# Patient Record
Sex: MALE | Race: WHITE | URBAN - METROPOLITAN AREA
[De-identification: names, ages, dates, MRNs, and addresses within clinical notes are randomized per-mention and may not be internally consistent; named-entity substitution may affect disease eponyms.]

---

## 2023-02-02 ENCOUNTER — OUT OF OFFICE VISIT (OUTPATIENT)
Dept: URBAN - METROPOLITAN AREA MEDICAL CENTER 18 | Facility: MEDICAL CENTER | Age: 58
End: 2023-02-02

## 2023-02-02 ENCOUNTER — CLAIMS CREATED FROM THE CLAIM WINDOW (OUTPATIENT)
Dept: URBAN - METROPOLITAN AREA MEDICAL CENTER 18 | Facility: MEDICAL CENTER | Age: 58
End: 2023-02-02
Payer: COMMERCIAL

## 2023-02-02 DIAGNOSIS — R13.19 CERVICAL DYSPHAGIA: ICD-10-CM

## 2023-02-02 PROCEDURE — 99254 IP/OBS CNSLTJ NEW/EST MOD 60: CPT | Performed by: INTERNAL MEDICINE

## 2023-02-02 PROCEDURE — 99222 1ST HOSP IP/OBS MODERATE 55: CPT | Performed by: INTERNAL MEDICINE

## 2023-02-02 PROCEDURE — G8427 DOCREV CUR MEDS BY ELIG CLIN: HCPCS | Performed by: INTERNAL MEDICINE

## 2023-02-03 ENCOUNTER — CLAIMS CREATED FROM THE CLAIM WINDOW (OUTPATIENT)
Dept: URBAN - METROPOLITAN AREA MEDICAL CENTER 18 | Facility: MEDICAL CENTER | Age: 58
End: 2023-02-03
Payer: COMMERCIAL

## 2023-02-03 DIAGNOSIS — K22.2 ACQUIRED ESOPHAGEAL RING: ICD-10-CM

## 2023-02-03 DIAGNOSIS — K20.0 ALLERGIC EOSINOPHILIC ESOPHAGITIS: ICD-10-CM

## 2023-02-03 PROCEDURE — 43248 EGD GUIDE WIRE INSERTION: CPT | Performed by: INTERNAL MEDICINE

## 2023-02-03 PROCEDURE — 43239 EGD BIOPSY SINGLE/MULTIPLE: CPT | Performed by: INTERNAL MEDICINE

## 2023-02-03 PROCEDURE — 43249 ESOPH EGD DILATION <30 MM: CPT | Performed by: INTERNAL MEDICINE

## 2023-02-04 ENCOUNTER — CLAIMS CREATED FROM THE CLAIM WINDOW (OUTPATIENT)
Dept: URBAN - METROPOLITAN AREA MEDICAL CENTER 18 | Facility: MEDICAL CENTER | Age: 58
End: 2023-02-04
Payer: COMMERCIAL

## 2023-02-04 DIAGNOSIS — K20.0 ALLERGIC EOSINOPHILIC ESOPHAGITIS: ICD-10-CM

## 2023-02-04 DIAGNOSIS — K22.2 ACQUIRED ESOPHAGEAL RING: ICD-10-CM

## 2023-02-04 PROCEDURE — 99232 SBSQ HOSP IP/OBS MODERATE 35: CPT | Performed by: INTERNAL MEDICINE

## 2023-02-05 ENCOUNTER — CLAIMS CREATED FROM THE CLAIM WINDOW (OUTPATIENT)
Dept: URBAN - METROPOLITAN AREA MEDICAL CENTER 18 | Facility: MEDICAL CENTER | Age: 58
End: 2023-02-05
Payer: COMMERCIAL

## 2023-02-05 DIAGNOSIS — K20.0 ALLERGIC EOSINOPHILIC ESOPHAGITIS: ICD-10-CM

## 2023-02-05 DIAGNOSIS — K22.2 ACQUIRED ESOPHAGEAL RING: ICD-10-CM

## 2023-02-05 PROCEDURE — 99231 SBSQ HOSP IP/OBS SF/LOW 25: CPT | Performed by: INTERNAL MEDICINE

## 2023-02-06 ENCOUNTER — CLAIMS CREATED FROM THE CLAIM WINDOW (OUTPATIENT)
Dept: URBAN - METROPOLITAN AREA MEDICAL CENTER 18 | Facility: MEDICAL CENTER | Age: 58
End: 2023-02-06
Payer: COMMERCIAL

## 2023-02-06 DIAGNOSIS — K22.2 ACQUIRED ESOPHAGEAL RING: ICD-10-CM

## 2023-02-06 DIAGNOSIS — K20.0 ALLERGIC EOSINOPHILIC ESOPHAGITIS: ICD-10-CM

## 2023-02-06 PROCEDURE — 99232 SBSQ HOSP IP/OBS MODERATE 35: CPT | Performed by: INTERNAL MEDICINE

## 2023-02-08 ENCOUNTER — WEB ENCOUNTER (OUTPATIENT)
Dept: URBAN - METROPOLITAN AREA CLINIC 74 | Facility: CLINIC | Age: 58
End: 2023-02-08

## 2023-02-09 ENCOUNTER — WEB ENCOUNTER (OUTPATIENT)
Dept: URBAN - METROPOLITAN AREA CLINIC 74 | Facility: CLINIC | Age: 58
End: 2023-02-09

## 2023-02-14 ENCOUNTER — OFFICE VISIT (OUTPATIENT)
Dept: URBAN - METROPOLITAN AREA CLINIC 74 | Facility: CLINIC | Age: 58
End: 2023-02-14
Payer: COMMERCIAL

## 2023-02-14 ENCOUNTER — OFFICE VISIT (OUTPATIENT)
Dept: URBAN - METROPOLITAN AREA CLINIC 74 | Facility: CLINIC | Age: 58
End: 2023-02-14

## 2023-02-14 VITALS
OXYGEN SATURATION: 98 % | HEART RATE: 76 BPM | DIASTOLIC BLOOD PRESSURE: 76 MMHG | TEMPERATURE: 97.9 F | SYSTOLIC BLOOD PRESSURE: 138 MMHG | WEIGHT: 163.6 LBS

## 2023-02-14 DIAGNOSIS — K22.2 SCHATZKI'S RING: ICD-10-CM

## 2023-02-14 DIAGNOSIS — K22.89 ESOPHAGEAL DILATATION: ICD-10-CM

## 2023-02-14 DIAGNOSIS — R13.12 OROPHARYNGEAL DYSPHAGIA: ICD-10-CM

## 2023-02-14 DIAGNOSIS — K44.9 HIATAL HERNIA: ICD-10-CM

## 2023-02-14 DIAGNOSIS — Z12.11 COLON CANCER SCREENING: ICD-10-CM

## 2023-02-14 DIAGNOSIS — K20.0 EOSINOPHILIC ESOPHAGITIS: ICD-10-CM

## 2023-02-14 PROCEDURE — 99204 OFFICE O/P NEW MOD 45 MIN: CPT | Performed by: PHYSICIAN ASSISTANT

## 2023-02-14 RX ORDER — PANTOPRAZOLE SODIUM 40 MG/1
1 TABLET TABLET, DELAYED RELEASE ORAL TWICE A DAY
Status: ACTIVE | COMMUNITY

## 2023-02-14 RX ORDER — SUCRALFATE 1 G/1
1 TABLET ON AN EMPTY STOMACH TABLET ORAL
Status: ACTIVE | COMMUNITY

## 2023-02-14 RX ORDER — POLYETHYLENE GLYCOL 3350 17 G/17G
AS DIRECTED POWDER, FOR SOLUTION ORAL
Qty: 238 G | Refills: 0 | OUTPATIENT
Start: 2023-02-14 | End: 2023-02-15

## 2023-02-14 RX ORDER — PANTOPRAZOLE SODIUM 40 MG/1
1 TABLET TABLET, DELAYED RELEASE ORAL TWICE A DAY
Qty: 60 | Refills: 3

## 2023-02-14 NOTE — HPI-TODAY'S VISIT:
The patient is 57-year-old male with past medical history as noted below with recent hospitalization on  01/30/2023 for chets pain. AGA was consulted to see the patient for oropharyngeal dysphagia prior to NISHA. He was supposed to be started at the time of dicharged on PPI every 12 hours and course of fluticasone x 8 weeks per Dr. Wilks note. He was also advised to follow up in our clinic in 2-4 weeks. However, the patient was only treated with Pantoprazole 40 mg twice daily and Sucralfate 1 g every 8 hours.  The patient had occasional symptoms of difficulty swallowing solid and sensation in his throat during swallowing prior to his EGD in 02/2023, therefore Cardiology consulted [facility] for EGD evaluation. EGD results as noted below. The patient states that he has family history of brother and his daughter with similar symptoms and frequent EGD with dilation but he does not know if they have EOE. He is doing better and no difficulty swallowing since his EGD. He was seen for follow up visit at Cardiothoracic Surgeon office Dr. Dexter Ferreira for plan to schedule NISHA and to repiar of AAA noted on CT imaging. However, there is still concern about NISHA probe passage through esophagus. Per Cardiology note: Esophageal stenosis. S/P EGD with dilation at 15 mm. Unable to place a normal NISHA probe therefore, we will need pediatric NISHA probe or follow up with GI for possible another dilatation. The patient needs pre-op NISHA and there is a conern with mucosal tear. The patient denies any other GI symptoms today including dysphagia or odynophagia.   ------------------------------Cardiology Summary: Ascending aortic aneurysm  - CTA Chest 01/30/2023 ascending aorta 5.0 cm and SofV 4.3 cm by manual measurement - Echo 02/06/2023 EF 51-55%; Mild AV calcification predominately on RCC. Mild AI. No AS.No obvious mass or vegetation noted. Probable Bicuspid AV - Echo 01/31/2023 EF 51-55%;  mild aortic valve calcification present, moderate AI. No AS. Possible echodensity noted on the MV, however unclear if there is a small echo density on the AV as well - [facility] 02/02/2023  Normal cors with high takeoff RCA( RCA off mid ascending);  AV not crossed due to possible echodensity;  - High risk for aortic complication based on height 69 " - Genetic testing   Aortic insufficiency, moderate - Echo 02/06/2023 EF 51-55% mild AI, no AS. Unclear if AV is bicuspid - Echo 01/31/2023 Moderate AI, No AS - Needs NISHA to further evaluate AV; Needs pediatric probe for procedure   ??? Echodensity MV  - TTE 02/06/2023 No obvious mass or vegetation noted - Unable to perform NISHA due to Schatzki ring/Esophagel stenosis - TTE 01/31/2023 possible echodensity noted on the MV, however unclear if there is a small echo density on the AV as well    -- The patient denies dyspepsia, dysphagia, odynophagia, hemoptysis, hematemesis, nausea, vomiting, regurgitation, melena, constipation, diarrhea, abdominal pain, hematochezia, fever, chills, chest pain, SOB, or any other GI complaints today. -- The patient denies ETOH, Tobacco, and Illicit drug use. -- The patient is up to date with Flu and COVID vaccine.  Diagnostic Studies: -- Labs on 02/14/2023 CBC with Hgb 13.8 nad Hct 38.9.   -- CTA of head and neck on 01/31/2023 with Unremarkable CT angiogram of the neck and head.  Incompletely imaged, ascending aortic aneurysm, measuring up to 4.6 cm in diameter, for which follow-up is advised. Nonemergent aortic protocol CT angiogram of the chest would provide additional characterization, as clinically indicated.  -- CTA of neck on 01/31/2023 with normal MR angiogram of the neck without IV contrast.   -- CT of head on 01/30/2023 with no CT evidence of an acute intracranial abnormality  Procedure: -- EGD with biopsy and dilation on 02/03/2023 by Dr. Wilks noted Schatzki's ring at 39 cm from the incisors with moderate stenosis.  Biopsy performed to "break" the ring and dilation was performed up to a 51-English caliber with a through-the-scope balloon with good effect. A ringed esophagus and white patches suggestive of eosinophilic esophagus noted in the proximal and mid esophagus, biopsy performed. Ring stenosis at 28 cm from the incisors.  Moderate depth tear noted after dilation to 45-English with Savary dilator. 2 cm hiatal hernia. Biospy of esophagus with benign squamous mucosa with increased number of intraepithelial eosinophils. Stomach with chronic inflammation. No H. pylori organisms.

## 2023-02-14 NOTE — PHYSICAL EXAM EYES:
Conjuntivae and eyelids appear normal, Sclerae : White without injection OUTPATIENT PROGRESS NOTE  Date of Service:  11/11/2020  [facility]  [address] [place] [address]  Dept: [phone]  Loc: [phone]    Subjective:      Patient ID:  <[id number]>  Jamie De Leon is a 57 y.o. female    HPI: Patient here for Thyroid. Patient continuing to take 50 mcg daily of synthroid. Patient continuing to take 150 mg of wellbutrin and 20 mg of paxil daily. Denies concerns with this. Patient getting about 7 hours of sleep per night. Patient states she has trouble falling asleep and staying asleep. On the weekends, she states she can sleep all day. Patient states she does not feel well rested. States her sleep problems have been happening for years. Patient has tried [place] in the past but states she did not like it because it made her sleep walk. Patient reports she does snore. Patient does drink tea and caffeine all throughout the day. Patient does not exercise. Currently working from home as an . Patient has PAP smear scheduled for later this month. No other concerns today. Review of Systems   Constitutional: Positive for fatigue. Negative for activity change, appetite change and fever. HENT: Negative. Eyes: Negative. Respiratory: Negative. Negative for cough, shortness of breath and wheezing. Cardiovascular: Negative. Gastrointestinal: Negative for constipation, diarrhea, nausea and vomiting. Endocrine: Negative. Genitourinary: Negative. Musculoskeletal: Negative. Allergic/Immunologic: Negative. Neurological: Negative. Hematological: Negative. Psychiatric/Behavioral: Negative. Objective:   Physical Exam  Vitals signs reviewed. Constitutional:       General: She is awake. Appearance: Normal appearance. HENT:      Head: Normocephalic and atraumatic. Neck:      Musculoskeletal: Normal range of motion.    Cardiovascular:      Rate and Rhythm: Normal rate and regular rhythm. Pulses: Normal pulses. Heart sounds: Normal heart sounds. Pulmonary:      Effort: Pulmonary effort is normal.      Breath sounds: Normal breath sounds. Abdominal:      General: Bowel sounds are normal.      Palpations: Abdomen is soft. Musculoskeletal: Normal range of motion. Skin:     General: Skin is warm and dry. Capillary Refill: Capillary refill takes less than 2 seconds. Neurological:      General: No focal deficit present. Mental Status: She is alert. Mental status is at baseline. Cranial Nerves: Cranial nerves are intact. Sensory: Sensation is intact. Motor: Motor function is intact. Coordination: Coordination is intact. Gait: Gait is intact. Psychiatric:         Attention and Perception: Attention and perception normal.         Mood and Affect: Mood and affect normal.         Speech: Speech normal.         Behavior: Behavior normal. Behavior is cooperative. Thought Content: Thought content normal.         Cognition and Memory: Cognition and memory normal.         Judgment: Judgment normal.           Assessment/Plan   1. Acquired hypothyroidism  TSH without reflex collected today. Will refilled synthroid dose once TSH lab results.   - TSH without Reflex    2. Fatigue, unspecified type  CMP, CBC, and Iron and TIBC collected today. Encourage patient to stop caffeine intake after 2 pm and encourage patient to exercise for at least 10 minutes per day. Talked to patient about trying to get 7,000 steps in per day. Increase water intake to at least 64 oz per day and discontinue tea after 2 pm.   - Comprehensive Metabolic Panel  - CBC Auto Differential  - Iron and TIBC    3. Anxiety and depression  Continue taking wellbutrin and paxil as prescribed. F/U in 6 weeks for sleep.                  Ishmael Menifee, APRN - CNP

## 2023-02-16 ENCOUNTER — TELEPHONE ENCOUNTER (OUTPATIENT)
Dept: URBAN - METROPOLITAN AREA CLINIC 74 | Facility: CLINIC | Age: 58
End: 2023-02-16

## 2023-02-24 ENCOUNTER — CLAIMS CREATED FROM THE CLAIM WINDOW (OUTPATIENT)
Dept: URBAN - METROPOLITAN AREA CLINIC 4 | Facility: CLINIC | Age: 58
End: 2023-02-24
Payer: COMMERCIAL

## 2023-02-24 ENCOUNTER — OFFICE VISIT (OUTPATIENT)
Dept: URBAN - METROPOLITAN AREA SURGERY CENTER 30 | Facility: SURGERY CENTER | Age: 58
End: 2023-02-24
Payer: COMMERCIAL

## 2023-02-24 DIAGNOSIS — Z12.11 COLON CANCER SCREENING: ICD-10-CM

## 2023-02-24 DIAGNOSIS — D12.4 BENIGN NEOPLASM OF DESCENDING COLON: ICD-10-CM

## 2023-02-24 DIAGNOSIS — D12.8 ADENOMATOUS POLYP OF RECTUM: ICD-10-CM

## 2023-02-24 DIAGNOSIS — D12.4 ADENOMA OF DESCENDING COLON: ICD-10-CM

## 2023-02-24 PROCEDURE — 88305 TISSUE EXAM BY PATHOLOGIST: CPT | Performed by: PATHOLOGY

## 2023-02-24 PROCEDURE — 45385 COLONOSCOPY W/LESION REMOVAL: CPT | Performed by: INTERNAL MEDICINE

## 2023-02-24 PROCEDURE — 45380 COLONOSCOPY AND BIOPSY: CPT | Performed by: INTERNAL MEDICINE

## 2023-02-24 PROCEDURE — G8907 PT DOC NO EVENTS ON DISCHARG: HCPCS | Performed by: INTERNAL MEDICINE

## 2023-03-01 ENCOUNTER — OFFICE VISIT (OUTPATIENT)
Dept: URBAN - METROPOLITAN AREA CLINIC 80 | Facility: CLINIC | Age: 58
End: 2023-03-01

## 2023-03-07 PROBLEM — 235599003: Status: ACTIVE | Noted: 2023-02-09

## 2023-03-07 PROBLEM — 428283002: Status: ACTIVE | Noted: 2023-03-07

## 2023-03-07 PROBLEM — 71457002: Status: ACTIVE | Noted: 2023-02-09

## 2023-03-07 PROBLEM — 235623002: Status: ACTIVE | Noted: 2023-02-09

## 2023-03-15 ENCOUNTER — TELEPHONE ENCOUNTER (OUTPATIENT)
Dept: URBAN - METROPOLITAN AREA CLINIC 35 | Facility: CLINIC | Age: 58
End: 2023-03-15

## 2023-03-24 ENCOUNTER — OFFICE VISIT (OUTPATIENT)
Dept: URBAN - METROPOLITAN AREA CLINIC 74 | Facility: CLINIC | Age: 58
End: 2023-03-24
Payer: COMMERCIAL

## 2023-03-24 VITALS
TEMPERATURE: 97.1 F | HEIGHT: 69 IN | WEIGHT: 157.4 LBS | SYSTOLIC BLOOD PRESSURE: 110 MMHG | HEART RATE: 86 BPM | DIASTOLIC BLOOD PRESSURE: 64 MMHG | BODY MASS INDEX: 23.31 KG/M2

## 2023-03-24 DIAGNOSIS — K22.89 ESOPHAGEAL DILATATION: ICD-10-CM

## 2023-03-24 DIAGNOSIS — Z86.010 PERSONAL HISTORY OF COLONIC POLYPS: ICD-10-CM

## 2023-03-24 DIAGNOSIS — K20.0 EOSINOPHILIC ESOPHAGITIS: ICD-10-CM

## 2023-03-24 DIAGNOSIS — K22.2 SCHATZKI'S RING: ICD-10-CM

## 2023-03-24 DIAGNOSIS — K44.9 HIATAL HERNIA: ICD-10-CM

## 2023-03-24 DIAGNOSIS — K64.8 INTERNAL HEMORRHOIDS: ICD-10-CM

## 2023-03-24 PROCEDURE — 99213 OFFICE O/P EST LOW 20 MIN: CPT | Performed by: PHYSICIAN ASSISTANT

## 2023-03-24 RX ORDER — PANTOPRAZOLE SODIUM 40 MG/1
1 TABLET TABLET, DELAYED RELEASE ORAL TWICE A DAY
Qty: 60 | Refills: 3 | Status: ACTIVE | COMMUNITY

## 2023-03-24 RX ORDER — SUCRALFATE 1 G/1
1 TABLET ON AN EMPTY STOMACH TABLET ORAL
Status: DISCONTINUED | COMMUNITY

## 2023-03-24 NOTE — HPI-TODAY'S VISIT:
The patient is 57-year-old male with past medical history as noted below is here to discuss his recent Colonoscopy results. No new GI issues today.   -- He was seen by ENT Dr. Rolando Kelley today for Uvulitis after NISHA, posttraumatic.   -- S/P 3D NISHA for aortic/mitral valve disease with moderate sedation on 03/22/2023 by Dr. Reggie Larry III.    -- The patient denies dyspepsia, dysphagia, odynophagia, hemoptysis, hematemesis, nausea, vomiting, regurgitation, melena, constipation, diarrhea, abdominal pain, hematochezia, fever, chills, chest pain, SOB, or any other GI complaints today. -- The patient denies ETOH, Tobacco, and Illicit drug use. -- The patient is up to date with Flu and COVID vaccine.  Diagnostic Studies: -- BS study on 02/16/2023 with evidence for a small hiatal hernia with a B-ring. Mild narrowing at the B-ring is noted. A 13 mm barium tablet passed this region without difficulty.  -- Labs on 02/14/2023 CBC with Hgb 13.8 nad Hct 38.9.   -- CTA of head and neck on 01/31/2023 with Unremarkable CT angiogram of the neck and head.  Incompletely imaged, ascending aortic aneurysm, measuring up to 4.6 cm in diameter, for which follow-up is advised. Nonemergent aortic protocol CT angiogram of the chest would provide additional characterization, as clinically indicated.  -- CTA of neck on 01/31/2023 with normal MR angiogram of the neck without IV contrast.   -- CT of head on 01/30/2023 with no CT evidence of an acute intracranial abnormality  Procedures: -- Colonoscopy with polypectomy on 02/20/2023 by Dr. Christianson noted one 25 mm polyp in the rectum, removed with a hot snare.  Resected and retrieved.  Clips were placed.  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  Resected and retrieved.  Non-bleeding internal hemorrhoids.  Repeat colonoscopy in 6 months for surveillance after piecemeal polypectomy.    Biopsy tubular and tubulovillous adenoma with focal high-grade dysplasia.  The polyp was completely removed.  Recommend surveillance colonoscopy in 3 months to document no residual lesion due to piecemeal polypectomy.   -- EGD with biopsy and dilation on 02/03/2023 by Dr. Wilks noted Schatzki's ring at 39 cm from the incisors with moderate stenosis.  Biopsy performed to "break" the ring and dilation was performed up to a 51-Somali caliber with a through-the-scope balloon with good effect. A ringed esophagus and white patches suggestive of eosinophilic esophagus noted in the proximal and mid esophagus, biopsy performed. Ring stenosis at 28 cm from the incisors.  Moderate depth tear noted after dilation to 45-Somali with Savary dilator. 2 cm hiatal hernia. Biospy of esophagus with benign squamous mucosa with increased number of intraepithelial eosinophils. Stomach with chronic inflammation. No H. pylori organisms.

## 2023-06-23 ENCOUNTER — OFFICE VISIT (OUTPATIENT)
Dept: URBAN - METROPOLITAN AREA CLINIC 74 | Facility: CLINIC | Age: 58
End: 2023-06-23

## 2023-06-23 RX ORDER — PANTOPRAZOLE SODIUM 40 MG/1
1 TABLET TABLET, DELAYED RELEASE ORAL TWICE A DAY
Qty: 60 | Refills: 3 | Status: ACTIVE | COMMUNITY

## 2023-06-23 NOTE — HPI-TODAY'S VISIT:
The patient is 57-year-old male with past medical history as noted below is here to schedule his repaet EGD and Colonoscopy today.  -- He was seen by ENT Dr. Rolando Kelley today for Uvulitis after NISHA, posttraumatic.   -- S/P 3D NISHA for aortic/mitral valve disease with moderate sedation on 03/22/2023 by Dr. Reggie Larry III.    -- The patient denies dyspepsia, dysphagia, odynophagia, hemoptysis, hematemesis, nausea, vomiting, regurgitation, melena, constipation, diarrhea, abdominal pain, hematochezia, fever, chills, chest pain, SOB, or any other GI complaints today. -- The patient denies ETOH, Tobacco, and Illicit drug use. -- The patient is up to date with Flu and COVID vaccine. -- Denies NSAID's.   Diagnostic Studies: -- BS study on 02/16/2023 with evidence for a small hiatal hernia with a B-ring. Mild narrowing at the B-ring is noted. A 13 mm barium tablet passed this region without difficulty.  -- Labs on 02/14/2023 CBC with Hgb 13.8 nad Hct 38.9.   -- CTA of head and neck on 01/31/2023 with Unremarkable CT angiogram of the neck and head.  Incompletely imaged, ascending aortic aneurysm, measuring up to 4.6 cm in diameter, for which follow-up is advised. Nonemergent aortic protocol CT angiogram of the chest would provide additional characterization, as clinically indicated.  -- CTA of neck on 01/31/2023 with normal MR angiogram of the neck without IV contrast.   -- CT of head on 01/30/2023 with no CT evidence of an acute intracranial abnormality  Procedures: -- Colonoscopy with polypectomy on 02/20/2023 by Dr. Christianson noted one 25 mm polyp in the rectum, removed with a hot snare.  Resected and retrieved.  Clips were placed.  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  Resected and retrieved.  Non-bleeding internal hemorrhoids.  Repeat colonoscopy in 6 months for surveillance after piecemeal polypectomy.    Biopsy tubular and tubulovillous adenoma with focal high-grade dysplasia.  The polyp was completely removed.  Recommend surveillance colonoscopy in 3 months to document no residual lesion due to piecemeal polypectomy.   -- EGD with biopsy and dilation on 02/03/2023 by Dr. Wilks noted Schatzki's ring at 39 cm from the incisors with moderate stenosis.  Biopsy performed to "break" the ring and dilation was performed up to a 51-Vietnamese caliber with a through-the-scope balloon with good effect. A ringed esophagus and white patches suggestive of eosinophilic esophagus noted in the proximal and mid esophagus, biopsy performed. Ring stenosis at 28 cm from the incisors.  Moderate depth tear noted after dilation to 45-Vietnamese with Savary dilator. 2 cm hiatal hernia. Biospy of esophagus with benign squamous mucosa with increased number of intraepithelial eosinophils. Stomach with chronic inflammation. No H. pylori organisms.

## 2023-07-25 ENCOUNTER — WEB ENCOUNTER (OUTPATIENT)
Dept: URBAN - METROPOLITAN AREA CLINIC 74 | Facility: CLINIC | Age: 58
End: 2023-07-25

## 2023-07-28 ENCOUNTER — OFFICE VISIT (OUTPATIENT)
Dept: URBAN - METROPOLITAN AREA CLINIC 74 | Facility: CLINIC | Age: 58
End: 2023-07-28
Payer: COMMERCIAL

## 2023-07-28 ENCOUNTER — LAB OUTSIDE AN ENCOUNTER (OUTPATIENT)
Dept: URBAN - METROPOLITAN AREA CLINIC 74 | Facility: CLINIC | Age: 58
End: 2023-07-28

## 2023-07-28 VITALS
BODY MASS INDEX: 23.22 KG/M2 | HEIGHT: 68 IN | HEART RATE: 79 BPM | DIASTOLIC BLOOD PRESSURE: 90 MMHG | TEMPERATURE: 97.7 F | SYSTOLIC BLOOD PRESSURE: 138 MMHG | WEIGHT: 153.2 LBS

## 2023-07-28 DIAGNOSIS — K20.0 EOSINOPHILIC ESOPHAGITIS: ICD-10-CM

## 2023-07-28 DIAGNOSIS — K22.2 SCHATZKI'S RING: ICD-10-CM

## 2023-07-28 DIAGNOSIS — K22.89 ESOPHAGEAL DILATATION: ICD-10-CM

## 2023-07-28 DIAGNOSIS — K44.9 HIATAL HERNIA: ICD-10-CM

## 2023-07-28 PROCEDURE — 99214 OFFICE O/P EST MOD 30 MIN: CPT | Performed by: INTERNAL MEDICINE

## 2023-07-28 RX ORDER — ASPIRIN 81 MG/1
1 TABLET TABLET, COATED ORAL ONCE A DAY
Status: ACTIVE | COMMUNITY

## 2023-07-28 RX ORDER — PANTOPRAZOLE SODIUM 40 MG/1
1 TABLET TABLET, DELAYED RELEASE ORAL ONCE A DAY
Qty: 30 TABLET | Refills: 5

## 2023-07-28 RX ORDER — CLOPIDOGREL 75 MG/1
1 TABLET TABLET, FILM COATED ORAL ONCE A DAY
Status: ACTIVE | COMMUNITY

## 2023-07-28 RX ORDER — PANTOPRAZOLE SODIUM 40 MG/1
1 TABLET TABLET, DELAYED RELEASE ORAL TWICE A DAY
Qty: 60 | Refills: 3 | Status: ACTIVE | COMMUNITY

## 2023-07-28 RX ORDER — ATORVASTATIN CALCIUM 40 MG/1
1 TABLET TABLET, FILM COATED ORAL ONCE A DAY
Status: ACTIVE | COMMUNITY

## 2023-07-28 NOTE — HPI-TODAY'S VISIT:
The patient is 57-year-old male with past medical history as noted below known to Dr. Christianson is here to schedule his repeat EGD and Colonoscopy today. The patient with extended Cardiac work up recently and he is currently on Plavix and ASA. He is doing well from GI stand point and no GI issues today. He tapered down Pantoprazole 40 mg to once daily now with good response. No GI complaints today.   Recent Cardiac Summary: 1. Ascending Aortic Aneurysm S/P Ascending Aortic Replacement 30 mm Gelweave Graft 04/13/2023 (Dr. Ferreira) - TTE 04/13/2023 EF 61-65%; Bioprosthetic aortic valve not well seen on this study - Intraoperative NISHA 04/13/2023 EF normal; there is a bioprosthetic valve in the aortic position.  The prosthetic valve is well-seated.  There is no stenosis of the aortic valve.  There is no aortic insufficiency. No aortic dissection - CTA Chest 01/30/2023 ascending aorta 5.0 cm and Sof V 4.3 cm by manual measurement - Sieyulia's 1 Bicuspid aortic valve - High risk for aortic complication based on height 69 " - pending genetic testing  - Metoprolol - Continue Cardiac Rehab   2. Bicuspid AV/ moderate to severe aortic insufficiency s/p  Aortic valve replacement with a 27 mm Medtronic Avalus Bioprosthesis 04/13/2023 ( Dr. Ferreira) - Siever's 1 Bicuspid aortic valve - NISHA 04/13/2023; EF normal; there is a bioprosthetic valve in the aortic position.  The prosthetic valve is well-seated.  There is no stenosis of the aortic valve.  There is no aortic insufficiency. No aortic dissection - NISHA 3/22/23 EF 61-65% Juliana 1 bicuspid aortic vavle with fusion of RCC and LCC with moderate central AI, there is a false chordae present extending from the anterolateral papillary muscle which does not appear to significantly affect the MV function. Post-NISHA developed tongue/uvula lesion and uvulitis post-traumatic per ENT   3. 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 Ferreira) - TTE 04/13/2023 EF 61-65% - [facility] 02/02/2023  Normal cors with high takeoff RCA (RCA off mid ascending)  - Aspirin, Atorvastatin, Clopidogrel, Metoprolol   4. Post op Thrombocytopenia/Coagulopathy - Received 1 round of products, additional cryo, 3 units PRBC's   5.  Echodensity MV and AV - TTE 02/06/ 2023 No obvious mass or vegetation noted - TTE 01/31/2023 possible echodensity noted on the MV, however unclear if there is a small echo density on the AV as well   -- The patient denies dyspepsia, dysphagia, odynophagia, hemoptysis, hematemesis, nausea, vomiting, regurgitation, melena, constipation, diarrhea, abdominal pain, hematochezia, fever, chills, chest pain, SOB, or any other GI complaints today. -- The patient denies ETOH, Tobacco, and Illicit drug use. -- The patient is up to date with Flu and COVID vaccine. -- Denies NSAID's.   Diagnostic Studies: -- BS study on 02/16/2023 with evidence for a small hiatal hernia with a B-ring. Mild narrowing at the B-ring is noted. A 13 mm barium tablet passed this region without difficulty.  -- Labs on 02/14/2023 CBC with Hgb 13.8 nad Hct 38.9.   -- CTA of head and neck on 01/31/2023 with Unremarkable CT angiogram of the neck and head.  Incompletely imaged, ascending aortic aneurysm, measuring up to 4.6 cm in diameter, for which follow-up is advised. Nonemergent aortic protocol CT angiogram of the chest would provide additional characterization, as clinically indicated.  -- CTA of neck on 01/31/2023 with normal MR angiogram of the neck without IV contrast.   -- CT of head on 01/30/2023 with no CT evidence of an acute intracranial abnormality  Procedures: -- Colonoscopy with polypectomy on 02/20/2023 by Dr. Christianson noted one 25 mm polyp in the rectum, removed with a hot snare.  Resected and retrieved.  Clips were placed.  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  Resected and retrieved.  Non-bleeding internal hemorrhoids.  Repeat colonoscopy in 6 months for surveillance after piecemeal polypectomy.    Biopsy tubular and tubulovillous adenoma with focal high-grade dysplasia.  The polyp was completely removed.  Recommend surveillance colonoscopy in 3 months to document no residual lesion due to piecemeal polypectomy.   -- EGD with biopsy and dilation on 02/03/2023 by Dr. Wilks noted Schatzki's ring at 39 cm from the incisors with moderate stenosis.  Biopsy performed to "break" the ring and dilation was performed up to a 51-Czech caliber with a through-the-scope balloon with good effect. A ringed esophagus and white patches suggestive of eosinophilic esophagus noted in the proximal and mid esophagus, biopsy performed. Ring stenosis at 28 cm from the incisors.  Moderate depth tear noted after dilation to 45-Czech with Savary dilator. 2 cm hiatal hernia. Biospy of esophagus with benign squamous mucosa with increased number of intraepithelial eosinophils. Stomach with chronic inflammation. No H. pylori organisms.

## 2023-08-16 ENCOUNTER — TELEPHONE ENCOUNTER (OUTPATIENT)
Dept: URBAN - METROPOLITAN AREA CLINIC 74 | Facility: CLINIC | Age: 58
End: 2023-08-16

## 2023-10-27 ENCOUNTER — OFFICE VISIT (OUTPATIENT)
Dept: URBAN - METROPOLITAN AREA SURGERY CENTER 30 | Facility: SURGERY CENTER | Age: 58
End: 2023-10-27

## 2023-12-08 ENCOUNTER — OFFICE VISIT (OUTPATIENT)
Dept: URBAN - METROPOLITAN AREA SURGERY CENTER 30 | Facility: SURGERY CENTER | Age: 58
End: 2023-12-08
Payer: COMMERCIAL

## 2023-12-08 ENCOUNTER — CLAIMS CREATED FROM THE CLAIM WINDOW (OUTPATIENT)
Dept: URBAN - METROPOLITAN AREA CLINIC 4 | Facility: CLINIC | Age: 58
End: 2023-12-08
Payer: COMMERCIAL

## 2023-12-08 DIAGNOSIS — K29.60 ADENOPAPILLOMATOSIS GASTRICA: ICD-10-CM

## 2023-12-08 DIAGNOSIS — K22.2 SCHATZKI'S RING: ICD-10-CM

## 2023-12-08 DIAGNOSIS — K29.70 GASTRITIS, UNSPECIFIED, WITHOUT BLEEDING: ICD-10-CM

## 2023-12-08 DIAGNOSIS — K57.30 DIVERTICULA OF COLON: ICD-10-CM

## 2023-12-08 DIAGNOSIS — D12.8 ADENOMATOUS POLYP OF RECTUM: ICD-10-CM

## 2023-12-08 DIAGNOSIS — K62.1 POLYP OF RECTUM: ICD-10-CM

## 2023-12-08 DIAGNOSIS — K22.89 OTHER SPECIFIED DISEASE OF ESOPHAGUS: ICD-10-CM

## 2023-12-08 DIAGNOSIS — K29.50 CHRONIC GASTRITIS: ICD-10-CM

## 2023-12-08 DIAGNOSIS — Z86.010 ADENOMAS PERSONAL HISTORY OF COLONIC POLYPS: ICD-10-CM

## 2023-12-08 DIAGNOSIS — K22.2 ACQUIRED ESOPHAGEAL RING: ICD-10-CM

## 2023-12-08 PROCEDURE — 88305 TISSUE EXAM BY PATHOLOGIST: CPT | Performed by: PATHOLOGY

## 2023-12-08 PROCEDURE — 45385 COLONOSCOPY W/LESION REMOVAL: CPT | Performed by: INTERNAL MEDICINE

## 2023-12-08 PROCEDURE — G8907 PT DOC NO EVENTS ON DISCHARG: HCPCS | Performed by: INTERNAL MEDICINE

## 2023-12-08 PROCEDURE — 43239 EGD BIOPSY SINGLE/MULTIPLE: CPT | Performed by: INTERNAL MEDICINE

## 2023-12-08 PROCEDURE — 00813 ANES UPR LWR GI NDSC PX: CPT | Performed by: NURSE ANESTHETIST, CERTIFIED REGISTERED

## 2023-12-08 PROCEDURE — 88342 IMHCHEM/IMCYTCHM 1ST ANTB: CPT | Performed by: PATHOLOGY

## 2024-03-11 PROBLEM — 397881000: Status: ACTIVE | Noted: 2024-03-11

## 2024-03-15 ENCOUNTER — LAB (OUTPATIENT)
Dept: URBAN - METROPOLITAN AREA CLINIC 74 | Facility: CLINIC | Age: 59
End: 2024-03-15

## 2024-03-15 ENCOUNTER — OV EP (OUTPATIENT)
Dept: URBAN - METROPOLITAN AREA CLINIC 74 | Facility: CLINIC | Age: 59
End: 2024-03-15
Payer: COMMERCIAL

## 2024-03-15 VITALS
HEIGHT: 68 IN | TEMPERATURE: 97.7 F | SYSTOLIC BLOOD PRESSURE: 120 MMHG | BODY MASS INDEX: 24.34 KG/M2 | DIASTOLIC BLOOD PRESSURE: 78 MMHG | WEIGHT: 160.6 LBS | HEART RATE: 73 BPM

## 2024-03-15 DIAGNOSIS — K20.0 EOSINOPHILIC ESOPHAGITIS: ICD-10-CM

## 2024-03-15 DIAGNOSIS — K64.8 INTERNAL HEMORRHOIDS: ICD-10-CM

## 2024-03-15 DIAGNOSIS — K22.2 SCHATZKI'S RING: ICD-10-CM

## 2024-03-15 DIAGNOSIS — K57.90 DIVERTICULOSIS: ICD-10-CM

## 2024-03-15 PROCEDURE — 99213 OFFICE O/P EST LOW 20 MIN: CPT | Performed by: PHYSICIAN ASSISTANT

## 2024-03-15 RX ORDER — ASPIRIN 81 MG/1
1 TABLET TABLET, COATED ORAL ONCE A DAY
Status: ACTIVE | COMMUNITY

## 2024-03-15 RX ORDER — PANTOPRAZOLE SODIUM 40 MG/1
1 TABLET TABLET, DELAYED RELEASE ORAL ONCE A DAY
Qty: 30 TABLET | Refills: 5 | Status: ACTIVE | COMMUNITY

## 2024-03-15 RX ORDER — CARVEDILOL 12.5 MG/1
TAKE ONE TABLET BY MOUTH TWICE A DAY TABLET, FILM COATED ORAL
Qty: 180 UNSPECIFIED | Refills: 1 | Status: ACTIVE | COMMUNITY

## 2024-03-15 RX ORDER — POLYETHYLENE GLYCOL 3350, SODIUM SULFATE, SODIUM CHLORIDE, POTASSIUM CHLORIDE, ASCORBIC ACID, SODIUM ASCORBATE 140-9-5.2G
AS DIRECTED KIT ORAL ONCE
Qty: 1 | Refills: 0 | OUTPATIENT
Start: 2024-03-15 | End: 2024-03-16

## 2024-03-15 RX ORDER — ATORVASTATIN CALCIUM 40 MG/1
1 TABLET TABLET, FILM COATED ORAL ONCE A DAY
Status: ACTIVE | COMMUNITY

## 2024-03-15 RX ORDER — PANTOPRAZOLE SODIUM 40 MG/1
1 TABLET TABLET, DELAYED RELEASE ORAL ONCE A DAY
Qty: 90 TABLET | Refills: 3

## 2024-03-15 RX ORDER — CLOPIDOGREL 75 MG/1
1 TABLET TABLET, FILM COATED ORAL ONCE A DAY
Status: ON HOLD | COMMUNITY

## 2024-03-15 NOTE — HPI-TODAY'S VISIT:
The patient is 57-year-old male with past medical history as noted below known to Dr. Christianson is here today to discuss his recent EGD and Colonoscopy. He continues on Pantoprazole 40 mg once daily with good response. He needs to schedule his colonoscopy. No GI issues today.    -- The patient denies dyspepsia, dysphagia, odynophagia, hemoptysis, hematemesis, nausea, vomiting, regurgitation, melena, constipation, diarrhea, abdominal pain, hematochezia, fever, chills, chest pain, SOB, or any other GI complaints today. -- The patient denies ETOH, Tobacco, and Illicit drug use. -- The patient is up to date with Flu and COVID vaccine. -- Denies NSAID's.    Procedures: -- EGD with biopsy on 12/08/2023 by Dr. Christianson noted esophageal mucosal changes secondary to eosinophilic esophagitis.  Non-obstructing Schatzki ring.  1 cm hiatal hernia.  Chronic gastritis.  Normal examined duodenum.  Biopsy of the stomach with chronic gastritis.  No H.pylori organisms.  No intestinal metaplasia.  Esophagus without significant abnormality.  No eosinophilic esophagitis.  No García's esophagus.  -- Colonoscopy polypectomy on 12/08/2023 by Dr. Christianson noted perianal skin tags found on perianal exam. One 15 mm polyp in the rectum, removed with a hot snare.  Resected and retrieved.  Clips was placed.  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  Non-bleeding internal hemorrhoids. One 5 mm polyp in the rectum, removed with a hot snare.  Resected and retrieved.  Diverticulosis in the sigmoid colon and in the descending colon.  Repeat colonoscopy in 6 months for surveillance after piecemeal polypectomy.  Biopsy tubular villous adenoma colon polyp.   -- Colonoscopy with polypectomy on 02/20/2023 by Dr. Christianson noted one 25 mm polyp in the rectum, removed with a hot snare.  Resected and retrieved.  Clips were placed.  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  Resected and retrieved.  Non-bleeding internal hemorrhoids.  Repeat colonoscopy in 6 months for surveillance after piecemeal polypectomy.    Biopsy tubular and tubulovillous adenoma with focal high-grade dysplasia.  The polyp was completely removed.  Recommend surveillance colonoscopy in 3 months to document no residual lesion due to piecemeal polypectomy.   -- EGD with biopsy and dilation on 02/03/2023 by Dr. Wilks noted Schatzki's ring at 39 cm from the incisors with moderate stenosis.  Biopsy performed to "break" the ring and dilation was performed up to a 51-Mongolian caliber with a through-the-scope balloon with good effect. A ringed esophagus and white patches suggestive of eosinophilic esophagus noted in the proximal and mid esophagus, biopsy performed. Ring stenosis at 28 cm from the incisors.  Moderate depth tear noted after dilation to 45-Mongolian with Savary dilator. 2 cm hiatal hernia. Biospy of esophagus with benign squamous mucosa with increased number of intraepithelial eosinophils. Stomach with chronic inflammation. No H. pylori organisms.

## 2024-05-21 ENCOUNTER — CLAIMS CREATED FROM THE CLAIM WINDOW (OUTPATIENT)
Dept: URBAN - METROPOLITAN AREA MEDICAL CENTER 18 | Facility: MEDICAL CENTER | Age: 59
End: 2024-05-21
Payer: COMMERCIAL

## 2024-05-21 ENCOUNTER — CLAIMS CREATED FROM THE CLAIM WINDOW (OUTPATIENT)
Dept: URBAN - METROPOLITAN AREA MEDICAL CENTER 18 | Facility: MEDICAL CENTER | Age: 59
End: 2024-05-21

## 2024-05-21 DIAGNOSIS — K22.2 ESOPHAGEAL OBSTRUCTION: ICD-10-CM

## 2024-05-21 PROCEDURE — 99253 IP/OBS CNSLTJ NEW/EST LOW 45: CPT | Performed by: INTERNAL MEDICINE

## 2024-05-22 ENCOUNTER — CLAIMS CREATED FROM THE CLAIM WINDOW (OUTPATIENT)
Dept: URBAN - METROPOLITAN AREA MEDICAL CENTER 18 | Facility: MEDICAL CENTER | Age: 59
End: 2024-05-22

## 2024-05-22 ENCOUNTER — CLAIMS CREATED FROM THE CLAIM WINDOW (OUTPATIENT)
Dept: URBAN - METROPOLITAN AREA MEDICAL CENTER 18 | Facility: MEDICAL CENTER | Age: 59
End: 2024-05-22
Payer: COMMERCIAL

## 2024-05-22 DIAGNOSIS — K22.2 ESOPHAGEAL OBSTRUCTION: ICD-10-CM

## 2024-05-22 PROCEDURE — 99231 SBSQ HOSP IP/OBS SF/LOW 25: CPT | Performed by: INTERNAL MEDICINE

## 2024-06-07 ENCOUNTER — OFFICE VISIT (OUTPATIENT)
Dept: URBAN - METROPOLITAN AREA SURGERY CENTER 30 | Facility: SURGERY CENTER | Age: 59
End: 2024-06-07

## 2024-07-02 ENCOUNTER — OFFICE VISIT (OUTPATIENT)
Dept: URBAN - METROPOLITAN AREA CLINIC 74 | Facility: CLINIC | Age: 59
End: 2024-07-02